# Patient Record
Sex: FEMALE | Race: OTHER | Employment: UNEMPLOYED | URBAN - METROPOLITAN AREA
[De-identification: names, ages, dates, MRNs, and addresses within clinical notes are randomized per-mention and may not be internally consistent; named-entity substitution may affect disease eponyms.]

---

## 2021-01-21 ENCOUNTER — OFFICE VISIT (OUTPATIENT)
Dept: FAMILY MEDICINE CLINIC | Age: 36
End: 2021-01-21

## 2021-01-21 ENCOUNTER — HOSPITAL ENCOUNTER (OUTPATIENT)
Dept: LAB | Age: 36
Discharge: HOME | End: 2021-01-21

## 2021-01-21 ENCOUNTER — HOSPITAL ENCOUNTER (OUTPATIENT)
Dept: GENERAL RADIOLOGY | Age: 36
Discharge: HOME | End: 2021-01-21

## 2021-01-21 VITALS
OXYGEN SATURATION: 99 % | BODY MASS INDEX: 28.47 KG/M2 | HEART RATE: 69 BPM | SYSTOLIC BLOOD PRESSURE: 116 MMHG | WEIGHT: 141.2 LBS | TEMPERATURE: 97.8 F | HEIGHT: 59 IN | DIASTOLIC BLOOD PRESSURE: 83 MMHG

## 2021-01-21 DIAGNOSIS — M54.50 CHRONIC BILATERAL LOW BACK PAIN WITHOUT SCIATICA: Primary | ICD-10-CM

## 2021-01-21 DIAGNOSIS — E83.51 HYPOCALCEMIA: ICD-10-CM

## 2021-01-21 DIAGNOSIS — Z23 ENCOUNTER FOR IMMUNIZATION: ICD-10-CM

## 2021-01-21 DIAGNOSIS — G44.219 EPISODIC TENSION-TYPE HEADACHE, NOT INTRACTABLE: ICD-10-CM

## 2021-01-21 DIAGNOSIS — Z13.9 ENCOUNTER FOR SCREENING: ICD-10-CM

## 2021-01-21 DIAGNOSIS — M54.50 CHRONIC BILATERAL LOW BACK PAIN WITHOUT SCIATICA: ICD-10-CM

## 2021-01-21 DIAGNOSIS — R42 DIZZINESS: ICD-10-CM

## 2021-01-21 DIAGNOSIS — E55.9 VITAMIN D DEFICIENCY: ICD-10-CM

## 2021-01-21 DIAGNOSIS — G89.29 CHRONIC BILATERAL LOW BACK PAIN WITHOUT SCIATICA: Primary | ICD-10-CM

## 2021-01-21 DIAGNOSIS — G89.29 CHRONIC BILATERAL LOW BACK PAIN WITHOUT SCIATICA: ICD-10-CM

## 2021-01-21 LAB
ALBUMIN SERPL-MCNC: 4.1 G/DL (ref 3.5–5)
ALBUMIN/GLOB SERPL: 1.2 {RATIO} (ref 1.1–2.2)
ALP SERPL-CCNC: 48 U/L (ref 45–117)
ALT SERPL-CCNC: 37 U/L (ref 12–78)
ANION GAP SERPL CALC-SCNC: 5 MMOL/L (ref 5–15)
AST SERPL-CCNC: 16 U/L (ref 15–37)
BILIRUB SERPL-MCNC: 0.2 MG/DL (ref 0.2–1)
BILIRUB UR QL STRIP: NEGATIVE
BUN SERPL-MCNC: 9 MG/DL (ref 6–20)
BUN/CREAT SERPL: 15 (ref 12–20)
CALCIUM SERPL-MCNC: 9.8 MG/DL (ref 8.5–10.1)
CHLORIDE SERPL-SCNC: 107 MMOL/L (ref 97–108)
CO2 SERPL-SCNC: 26 MMOL/L (ref 21–32)
CREAT SERPL-MCNC: 0.6 MG/DL (ref 0.55–1.02)
ERYTHROCYTE [DISTWIDTH] IN BLOOD BY AUTOMATED COUNT: 12.6 % (ref 11.5–14.5)
GLOBULIN SER CALC-MCNC: 3.3 G/DL (ref 2–4)
GLUCOSE SERPL-MCNC: 76 MG/DL (ref 65–100)
GLUCOSE UR-MCNC: NEGATIVE MG/DL
HCT VFR BLD AUTO: 40.6 % (ref 35–47)
HGB BLD-MCNC: 13.1 G/DL (ref 11.5–16)
KETONES P FAST UR STRIP-MCNC: NEGATIVE MG/DL
MCH RBC QN AUTO: 28.6 PG (ref 26–34)
MCHC RBC AUTO-ENTMCNC: 32.3 G/DL (ref 30–36.5)
MCV RBC AUTO: 88.6 FL (ref 80–99)
NRBC # BLD: 0 K/UL (ref 0–0.01)
NRBC BLD-RTO: 0 PER 100 WBC
PH UR STRIP: 6 [PH] (ref 4.6–8)
PLATELET # BLD AUTO: 326 K/UL (ref 150–400)
PMV BLD AUTO: 10.8 FL (ref 8.9–12.9)
POTASSIUM SERPL-SCNC: 4.6 MMOL/L (ref 3.5–5.1)
PROT SERPL-MCNC: 7.4 G/DL (ref 6.4–8.2)
PROT UR QL STRIP: NEGATIVE
RBC # BLD AUTO: 4.58 M/UL (ref 3.8–5.2)
SODIUM SERPL-SCNC: 138 MMOL/L (ref 136–145)
SP GR UR STRIP: 1.02 (ref 1–1.03)
UA UROBILINOGEN AMB POC: NORMAL (ref 0.2–1)
URINALYSIS CLARITY POC: CLEAR
URINALYSIS COLOR POC: YELLOW
URINE BLOOD POC: NEGATIVE
URINE LEUKOCYTES POC: NEGATIVE
URINE NITRITES POC: NEGATIVE
VIT B12 SERPL-MCNC: 712 PG/ML (ref 193–986)
WBC # BLD AUTO: 7.9 K/UL (ref 3.6–11)

## 2021-01-21 PROCEDURE — 82607 VITAMIN B-12: CPT

## 2021-01-21 PROCEDURE — 85027 COMPLETE CBC AUTOMATED: CPT

## 2021-01-21 PROCEDURE — 99214 OFFICE O/P EST MOD 30 MIN: CPT | Performed by: FAMILY MEDICINE

## 2021-01-21 PROCEDURE — 90471 IMMUNIZATION ADMIN: CPT | Performed by: FAMILY MEDICINE

## 2021-01-21 PROCEDURE — 81003 URINALYSIS AUTO W/O SCOPE: CPT | Performed by: FAMILY MEDICINE

## 2021-01-21 PROCEDURE — 90686 IIV4 VACC NO PRSV 0.5 ML IM: CPT | Performed by: FAMILY MEDICINE

## 2021-01-21 PROCEDURE — 82306 VITAMIN D 25 HYDROXY: CPT

## 2021-01-21 PROCEDURE — 72100 X-RAY EXAM L-S SPINE 2/3 VWS: CPT

## 2021-01-21 PROCEDURE — 80053 COMPREHEN METABOLIC PANEL: CPT

## 2021-01-21 NOTE — PROGRESS NOTES
Enriqueta Daily (: 1985) is a 30 y.o. female, established patient, here for evaluation of the following chief complaint(s):  LOW BACK PAIN (since 2017 when she had baby.), Dizziness (x headaches and head pain with inflammation x 1 year), and Immunization/Injection (FLU VACCINE)       ASSESSMENT/PLAN:  1. Chronic bilateral low back pain without sciatica  -     XR SPINE LUMB 2 OR 3 V; Future  Suggestive of sacroiliitis. Use topical Voltaren Gel. 2. Dizziness  Episodic with headaches. This is likely stress induced headaches. Will check labs due to chronicity. -     CBC W/O DIFF; Future  -     METABOLIC PANEL, COMPREHENSIVE; Future  -     VITAMIN B12; Future  3. Episodic tension-type headache, not intractable  As above  4. Encounter for screening  -     AMB POC URINALYSIS DIP STICK AUTO W/O MICRO  5. Hypocalcemia  On prior labs, will check Vitamin D level as deficiency could cause more pain. -     VITAMIN D, 25 HYDROXY; Future    Follow-up and Dispositions    · Return in about 4 weeks (around 2021) for F2F in 4-6 weeks for follow up. SUBJECTIVE/OBJECTIVE:  HPI  Back Pain: x 4 years, since her last pregnancy. Pain off/on on B lower back (SI joints) for years. Seems worse when she eats bread and rice. Over the past week the pain has been much worse. This week only has radiated at times into posterior Rt thigh. NO LE weakness or paresthesias. Has not tried any OTC medications. Dizziness: with headaches x 1 year. Gets Lt sided headaches, almost daily, x 1 year. Noted insomnia during this time. Started around the time that her children are not able to go to school due to [place]. Review of Systems  CONSTITUTIONAL:  Negative for chills, fever, weight change, malaise, night sweats. Positive for insomnia.   CARDIOVASCULAR:  Negative for palpitations, chest pain, syncope, dyspnea on exertion  NEUROLOGICAL:  Negative for vision changes, change in bowel or bladder function    Physical Exam  Blood pressure 116/83, pulse 69, temperature 97.8 °F (36.6 °C), temperature source Temporal, height 4' 11.06\" (1.5 m), weight 141 lb 3.2 oz (64 kg), last menstrual period 01/13/2021, SpO2 99 %. CONSTITUTIONAL:  Well developed. No apparent distress. PSYCHIATRIC: Oriented to time, place, person & situation. Appropriate mood and affect. NECK:  Normal inspection, normal palpation without any lymphadenopathy, masses, or thyromegaly  CARDIOVASCULAR:  Regular rate and rhythm. Normal S1, S2. No extra sounds. VASCULAR:  Normal Carotid pulses without bruits. MUSCULOSKELETAL: Spine appears normal with no curvature or deformity. Lumbar back has good flexion, extension, and rotation without pain. Pain reproduced over B SI joints. Results for orders placed or performed in visit on 01/21/21   AMB POC URINALYSIS DIP STICK AUTO W/O MICRO   Result Value Ref Range    Color (UA POC) Yellow     Clarity (UA POC) Clear     Glucose (UA POC) Negative Negative    Bilirubin (UA POC) Negative Negative    Ketones (UA POC) Negative Negative    Specific gravity (UA POC) 1.020 1.001 - 1.035    Blood (UA POC) Negative Negative    pH (UA POC) 6.0 4.6 - 8.0    Protein (UA POC) Negative Negative    Urobilinogen (UA POC) 0.2 mg/dL 0.2 - 1    Nitrites (UA POC) Negative Negative    Leukocyte esterase (UA POC) Negative Negative         An electronic signature was used to authenticate this note.   -- Umm Marmolejo MD

## 2021-01-21 NOTE — PROGRESS NOTES
Check-out Note: F2F in 4-6 weeks for follow up   Labs and xrays ordered   Patient instructions    AVS printed, given and reviewed. Walk in x-ray and care card process explained and FA application given. Patient aware that provider would like to follow up about today's visit in 4-6 weeks in person. Lab requisition form given and instructions on where to go to get labs drawn given. This has been fully explained to the patient, who indicates understanding and agrees with plan. No further questions at this time. BILL Berger RN administered flu vaccine. Please see scanned consent form for further details.

## 2021-01-21 NOTE — PROGRESS NOTES
Coordination of Care  1. Have you been to the ER, urgent care clinic since your last visit? Hospitalized since your last visit? No    2. Have you seen or consulted any other health care providers outside of the [address] [place] [address] since your last visit? Include any pap smears or colon screening. No    Does the patient need refills? NO    Learning Assessment Complete?  yes  Depression Screening complete in the past 12 months? yes     Results for orders placed or performed in visit on 01/21/21   AMB POC URINALYSIS DIP STICK AUTO W/O MICRO   Result Value Ref Range    Color (UA POC) Yellow     Clarity (UA POC) Clear     Glucose (UA POC) Negative Negative    Bilirubin (UA POC) Negative Negative    Ketones (UA POC) Negative Negative    Specific gravity (UA POC) 1.020 1.001 - 1.035    Blood (UA POC) Negative Negative    pH (UA POC) 6.0 4.6 - 8.0    Protein (UA POC) Negative Negative    Urobilinogen (UA POC) 0.2 mg/dL 0.2 - 1    Nitrites (UA POC) Negative Negative    Leukocyte esterase (UA POC) Negative Negative negative...

## 2021-01-21 NOTE — PROGRESS NOTES
Mild arthritis in the lower back which is likely causing some of her back pain. NO significant other findings.

## 2021-01-22 LAB — 25(OH)D3 SERPL-MCNC: 24.6 NG/ML (ref 30–100)

## 2021-01-22 RX ORDER — MELOXICAM 15 MG/1
15 TABLET ORAL
Qty: 30 TAB | Refills: 0 | Status: SHIPPED | OUTPATIENT
Start: 2021-01-22 | End: 2022-06-13

## 2021-01-22 NOTE — PROGRESS NOTES
Vitamin D is low and so start over the counter Vitamin D3 1,000 units once a day. Recheck Vitamin D in 6 months.   Other labs look good

## 2021-01-22 NOTE — PROGRESS NOTES
Tc to the pt with  Ramakrishna Peguero. The pt was given her spine xray result message from the provider. Mild arthritis which is likely causing some of her back pain. No significant finding. The pt asked the nurse if the Dr was going to give her any medications. The chart was reviewed no medications were ordered at this time. The pt would like to ask the Dr if he was going to order her something for the pain. The pt has mild back pain now but sometimes it is strong.  Routed the message to the Dr. Ileana Urbina RN

## 2021-01-26 NOTE — PROGRESS NOTES
Tc to the pt to let her know about the medication sent to her Pharmacy for her back pain. No answer. Called 2x left a message.  Bruno Zhong RN

## 2021-01-27 NOTE — PROGRESS NOTES
Tc to the pt Jose Blandno was . The pt was given her results and she was told the provider had sent in he ra new Rx for her back pain called Mobic, and he recommended she start taking an otc Vit D3 1000iu daily because her Vit D was low. The pt verbalized understanding and had no further questions.  Oscar Arango RN

## 2022-06-10 ENCOUNTER — HOSPITAL ENCOUNTER (EMERGENCY)
Age: 37
Discharge: HOME | End: 2022-06-10
Attending: STUDENT IN AN ORGANIZED HEALTH CARE EDUCATION/TRAINING PROGRAM

## 2022-06-10 ENCOUNTER — TELEPHONE (OUTPATIENT)
Dept: FAMILY MEDICINE CLINIC | Age: 37
End: 2022-06-10

## 2022-06-10 ENCOUNTER — APPOINTMENT (OUTPATIENT)
Dept: GENERAL RADIOLOGY | Age: 37
End: 2022-06-10
Attending: STUDENT IN AN ORGANIZED HEALTH CARE EDUCATION/TRAINING PROGRAM

## 2022-06-10 VITALS
OXYGEN SATURATION: 98 % | TEMPERATURE: 98 F | DIASTOLIC BLOOD PRESSURE: 66 MMHG | HEART RATE: 75 BPM | HEIGHT: 59 IN | RESPIRATION RATE: 17 BRPM | BODY MASS INDEX: 28.22 KG/M2 | SYSTOLIC BLOOD PRESSURE: 111 MMHG | WEIGHT: 140 LBS

## 2022-06-10 DIAGNOSIS — R07.9 CHEST PAIN, UNSPECIFIED TYPE: ICD-10-CM

## 2022-06-10 DIAGNOSIS — R11.0 NAUSEA WITHOUT VOMITING: ICD-10-CM

## 2022-06-10 DIAGNOSIS — R42 DIZZINESS: Primary | ICD-10-CM

## 2022-06-10 LAB
ALBUMIN SERPL-MCNC: 3.7 G/DL (ref 3.5–5)
ALBUMIN/GLOB SERPL: 1 {RATIO} (ref 1.1–2.2)
ALP SERPL-CCNC: 48 U/L (ref 45–117)
ALT SERPL-CCNC: 28 U/L (ref 12–78)
ANION GAP SERPL CALC-SCNC: 7 MMOL/L (ref 5–15)
APPEARANCE UR: CLEAR
AST SERPL-CCNC: 14 U/L (ref 15–37)
ATRIAL RATE: 74 BPM
BACTERIA URNS QL MICRO: NEGATIVE /HPF
BASOPHILS # BLD: 0 K/UL (ref 0–0.1)
BASOPHILS NFR BLD: 0 % (ref 0–1)
BILIRUB SERPL-MCNC: 0.2 MG/DL (ref 0.2–1)
BILIRUB UR QL: NEGATIVE
BUN SERPL-MCNC: 10 MG/DL (ref 6–20)
BUN/CREAT SERPL: 18 (ref 12–20)
CALCIUM SERPL-MCNC: 8.8 MG/DL (ref 8.5–10.1)
CALCULATED P AXIS, ECG09: 31 DEGREES
CALCULATED R AXIS, ECG10: 31 DEGREES
CALCULATED T AXIS, ECG11: 22 DEGREES
CHLORIDE SERPL-SCNC: 108 MMOL/L (ref 97–108)
CO2 SERPL-SCNC: 22 MMOL/L (ref 21–32)
COLOR UR: ABNORMAL
CREAT SERPL-MCNC: 0.57 MG/DL (ref 0.55–1.02)
D DIMER PPP FEU-MCNC: 0.19 MG/L FEU (ref 0–0.65)
DIAGNOSIS, 93000: NORMAL
DIFFERENTIAL METHOD BLD: NORMAL
EOSINOPHIL # BLD: 0 K/UL (ref 0–0.4)
EOSINOPHIL NFR BLD: 0 % (ref 0–7)
EPITH CASTS URNS QL MICRO: ABNORMAL /LPF
ERYTHROCYTE [DISTWIDTH] IN BLOOD BY AUTOMATED COUNT: 12.6 % (ref 11.5–14.5)
GLOBULIN SER CALC-MCNC: 3.7 G/DL (ref 2–4)
GLUCOSE SERPL-MCNC: 89 MG/DL (ref 65–100)
GLUCOSE UR STRIP.AUTO-MCNC: NEGATIVE MG/DL
HCG UR QL: NEGATIVE
HCT VFR BLD AUTO: 38.8 % (ref 35–47)
HGB BLD-MCNC: 12.8 G/DL (ref 11.5–16)
HGB UR QL STRIP: NEGATIVE
HYALINE CASTS URNS QL MICRO: ABNORMAL /LPF (ref 0–2)
IMM GRANULOCYTES # BLD AUTO: 0 K/UL (ref 0–0.04)
IMM GRANULOCYTES NFR BLD AUTO: 0 % (ref 0–0.5)
KETONES UR QL STRIP.AUTO: NEGATIVE MG/DL
LEUKOCYTE ESTERASE UR QL STRIP.AUTO: ABNORMAL
LYMPHOCYTES # BLD: 2 K/UL (ref 0.8–3.5)
LYMPHOCYTES NFR BLD: 20 % (ref 12–49)
MCH RBC QN AUTO: 27.6 PG (ref 26–34)
MCHC RBC AUTO-ENTMCNC: 33 G/DL (ref 30–36.5)
MCV RBC AUTO: 83.6 FL (ref 80–99)
MONOCYTES # BLD: 0.6 K/UL (ref 0–1)
MONOCYTES NFR BLD: 6 % (ref 5–13)
NEUTS SEG # BLD: 7.3 K/UL (ref 1.8–8)
NEUTS SEG NFR BLD: 74 % (ref 32–75)
NITRITE UR QL STRIP.AUTO: NEGATIVE
NRBC # BLD: 0 K/UL (ref 0–0.01)
NRBC BLD-RTO: 0 PER 100 WBC
P-R INTERVAL, ECG05: 144 MS
PH UR STRIP: 6.5 [PH] (ref 5–8)
PLATELET # BLD AUTO: 337 K/UL (ref 150–400)
PMV BLD AUTO: 10.3 FL (ref 8.9–12.9)
POTASSIUM SERPL-SCNC: 3.8 MMOL/L (ref 3.5–5.1)
PROT SERPL-MCNC: 7.4 G/DL (ref 6.4–8.2)
PROT UR STRIP-MCNC: NEGATIVE MG/DL
Q-T INTERVAL, ECG07: 402 MS
QRS DURATION, ECG06: 88 MS
QTC CALCULATION (BEZET), ECG08: 446 MS
RBC # BLD AUTO: 4.64 M/UL (ref 3.8–5.2)
RBC #/AREA URNS HPF: ABNORMAL /HPF (ref 0–5)
SODIUM SERPL-SCNC: 137 MMOL/L (ref 136–145)
SP GR UR REFRACTOMETRY: 1.01 (ref 1–1.03)
TROPONIN-HIGH SENSITIVITY: 4 NG/L (ref 0–51)
UROBILINOGEN UR QL STRIP.AUTO: 0.2 EU/DL (ref 0.2–1)
VENTRICULAR RATE, ECG03: 74 BPM
WBC # BLD AUTO: 10 K/UL (ref 3.6–11)
WBC URNS QL MICRO: ABNORMAL /HPF (ref 0–4)

## 2022-06-10 PROCEDURE — 80053 COMPREHEN METABOLIC PANEL: CPT

## 2022-06-10 PROCEDURE — 93005 ELECTROCARDIOGRAM TRACING: CPT

## 2022-06-10 PROCEDURE — 99285 EMERGENCY DEPT VISIT HI MDM: CPT

## 2022-06-10 PROCEDURE — 84484 ASSAY OF TROPONIN QUANT: CPT

## 2022-06-10 PROCEDURE — 85379 FIBRIN DEGRADATION QUANT: CPT

## 2022-06-10 PROCEDURE — 74011250636 HC RX REV CODE- 250/636: Performed by: STUDENT IN AN ORGANIZED HEALTH CARE EDUCATION/TRAINING PROGRAM

## 2022-06-10 PROCEDURE — 85025 COMPLETE CBC W/AUTO DIFF WBC: CPT

## 2022-06-10 PROCEDURE — 96360 HYDRATION IV INFUSION INIT: CPT

## 2022-06-10 PROCEDURE — 96361 HYDRATE IV INFUSION ADD-ON: CPT

## 2022-06-10 PROCEDURE — 81025 URINE PREGNANCY TEST: CPT

## 2022-06-10 PROCEDURE — 36415 COLL VENOUS BLD VENIPUNCTURE: CPT

## 2022-06-10 PROCEDURE — 81001 URINALYSIS AUTO W/SCOPE: CPT

## 2022-06-10 PROCEDURE — 74011250637 HC RX REV CODE- 250/637: Performed by: STUDENT IN AN ORGANIZED HEALTH CARE EDUCATION/TRAINING PROGRAM

## 2022-06-10 PROCEDURE — 71046 X-RAY EXAM CHEST 2 VIEWS: CPT

## 2022-06-10 RX ORDER — SODIUM CHLORIDE 9 MG/ML
1000 INJECTION, SOLUTION INTRAVENOUS ONCE
Status: COMPLETED | OUTPATIENT
Start: 2022-06-10 | End: 2022-06-10

## 2022-06-10 RX ORDER — MECLIZINE HYDROCHLORIDE 25 MG/1
50 TABLET ORAL
Status: COMPLETED | OUTPATIENT
Start: 2022-06-10 | End: 2022-06-10

## 2022-06-10 RX ORDER — ONDANSETRON 4 MG/1
4 TABLET, ORALLY DISINTEGRATING ORAL
Status: COMPLETED | OUTPATIENT
Start: 2022-06-10 | End: 2022-06-10

## 2022-06-10 RX ADMIN — MECLIZINE HYDROCHLORIDE 50 MG: 25 TABLET ORAL at 11:58

## 2022-06-10 RX ADMIN — ONDANSETRON 4 MG: 4 TABLET, ORALLY DISINTEGRATING ORAL at 15:19

## 2022-06-10 RX ADMIN — SODIUM CHLORIDE 1000 ML: 9 INJECTION, SOLUTION INTRAVENOUS at 11:59

## 2022-06-10 NOTE — TELEPHONE ENCOUNTER
RN received call from patient stating she wanted to see provider for current symptoms. RN triaged patient, patient reported feeling nauseas, dizzy, and chest pain 3/10. Patient stated she went to [facility] on 5/21/22 for the same symptoms and was informed it was a viral stomach infection. Patient stated she is still having the same symptoms but not as severe. RN informed patient to go to [facility] to evaluate chest pain, patient verbalized understanding. Appointment was made to evaluate other symptoms reported by patient for 5/13.   Shannan Cunningham

## 2022-06-10 NOTE — ED NOTES
Patient discharged per provider. Patient was in understanding of all instructions and to  her medication. Patient had no further questions.

## 2022-06-10 NOTE — TELEPHONE ENCOUNTER
I called patient back and spoke to her. I recommended for her to go to the nearest ED. She stated she went to [facility] ED on 5/21/22, and they did some testing. They told her it was not related to her heart, gave her some medication and send her home. She has been having dizzy spells, chest pains, nausea and vomiting, with no real improvement. I spoke with patient and advised that medications were sent to the pharmacy at patient's request. Also encouraged again to present to the ED. Offered patient an appt to come in to the office on Wednesday to have her wounds checked and see Dr. Akua Martinez. Patient declined, and asked for an appt after the first of the month due to transportation issues. I scheduled her for Friday, 7/2 at her request. She voiced understanding in waiting and also in not presenting to the ED but she really thinks the Linazolid is helping her wounds greatly.

## 2022-06-10 NOTE — TELEPHONE ENCOUNTER
Patient called this morning. She stated she is feeling very dizzy, and had chest pain two days ago. I'm calling a CVAN nurse, and documenting as well.

## 2022-06-10 NOTE — ED TRIAGE NOTES
Pt to ED for c/o dizziness, nausea, chest pain and head pain since 5/20/22 which has been intermittent since, states \"the room is spinning\". Chest pain described to be pressure like. Seen by [facility] ED and dc with no diagnosis per pt.  Awaiting appointment with [facility]/IMELDA     Denies PMH

## 2022-06-10 NOTE — ED PROVIDER NOTES
Patient is a 67-year-old female who is otherwise healthy presenting today with dizziness and chest pain. She reports that she had a similar episode back in May and was diagnosed with a viral stomach bug, discharged with Zofran which never really helped. She reports that 2 days ago she had a \"sharp\" pain in her left chest that lasted for 2 days but has since gone away and has left her with a pressure in the chest.  This morning around 3 AM she developed pressure in her head as well as a dizziness sensation with nausea but no vomiting or diarrhea. This is worse with head movement. She does feel like she is a little short of breath. She denies leg pain or leg swelling, no history of DVT or PE. No known cardiac disease. She denies focal weakness or numbness. No cough or fever. No cardiac or pulmonary history that she is aware of. Past Surgical History:   Procedure Laterality Date    HX  SECTION               Social History     Socioeconomic History    Marital status:      Spouse name: Not on file    Number of children: Not on file    Years of education: Not on file    Highest education level: Not on file   Occupational History    Not on file   Tobacco Use    Smoking status: Never Smoker    Smokeless tobacco: Never Used   Substance and Sexual Activity    Alcohol use: No     Alcohol/week: 0.0 standard drinks    Drug use: No    Sexual activity: Not on file   Other Topics Concern    Not on file   Social History Narrative    Not on file     Social Determinants of Health     Financial Resource Strain:     Difficulty of Paying Living Expenses: Not on file   Food Insecurity:     Worried About Running Out of Food in the Last Year: Not on file    Armaan of Food in the Last Year: Not on file   Transportation Needs:     Lack of Transportation (Medical): Not on file    Lack of Transportation (Non-Medical):  Not on file   Physical Activity:     Days of Exercise per Week: Not on file   [employer] of Exercise per Session: Not on file   Stress:     Feeling of Stress : Not on file   Social Connections:     Frequency of Communication with Friends and Family: Not on file    Frequency of Social Gatherings with Friends and Family: Not on file    Attends Protestant Services: Not on file    Active Member of Clubs or Organizations: Not on file    Attends Club or Organization Meetings: Not on file    Marital Status: Not on file   Intimate Partner Violence:     Fear of Current or Ex-Partner: Not on file    Emotionally Abused: Not on file    Physically Abused: Not on file    Sexually Abused: Not on file   Housing Stability:     Unable to Pay for Housing in the Last Year: Not on file    Number of [place] in the Last Year: Not on file    Unstable Housing in the Last Year: Not on file         ALLERGIES: Patient has no known allergies. Review of Systems   Constitutional: Negative for chills and fever. HENT: Negative for congestion and rhinorrhea. Eyes: Negative for redness and visual disturbance. Respiratory: Positive for shortness of breath. Negative for cough. Cardiovascular: Positive for chest pain. Negative for leg swelling. Gastrointestinal: Negative for abdominal pain, diarrhea, nausea and vomiting. Genitourinary: Negative for dysuria, flank pain, frequency, hematuria and urgency. Musculoskeletal: Negative for arthralgias, back pain, myalgias and neck pain. Skin: Negative for rash and wound. Allergic/Immunologic: Negative for immunocompromised state. Neurological: Positive for dizziness and headaches. Vitals:    06/10/22 1135 06/10/22 1506   BP: (!) 97/59 111/66   Pulse: 80 75   Resp: 16 17   Temp:  98 °F (36.7 °C)   SpO2: 98% 98%   Weight: 63.5 kg (140 lb)    Height: 4' 11\" (1.499 m)             Physical Exam  Vitals and nursing note reviewed. Constitutional:       General: She is not in acute distress. Appearance: She is well-developed.  She is not diaphoretic. HENT:      Head: Normocephalic. Mouth/Throat:      Pharynx: No oropharyngeal exudate. Eyes:      General:         Right eye: No discharge. Left eye: No discharge. Pupils: Pupils are equal, round, and reactive to light. Cardiovascular:      Rate and Rhythm: Normal rate and regular rhythm. Heart sounds: Normal heart sounds. No murmur heard. No friction rub. No gallop. Pulmonary:      Effort: Pulmonary effort is normal. No respiratory distress. Breath sounds: Normal breath sounds. No stridor. No wheezing or rales. Abdominal:      General: Bowel sounds are normal. There is no distension. Palpations: Abdomen is soft. Tenderness: There is no abdominal tenderness. There is no guarding or rebound. Musculoskeletal:         General: No deformity. Normal range of motion. Cervical back: Normal range of motion and neck supple. Skin:     General: Skin is warm and dry. Capillary Refill: Capillary refill takes less than 2 seconds. Findings: No rash. Neurological:      General: No focal deficit present. Mental Status: She is alert and oriented to person, place, and time. Cranial Nerves: No cranial nerve deficit. Sensory: No sensory deficit. Gait: Gait normal.   Psychiatric:         Behavior: Behavior normal.          MDM       Procedures      ED EKG interpretation  NSR rate 74, normal axis and intervals, non specific STT wave changes. The patient is a 44-year-old female presenting today with recurrence of similar symptoms that she had back in May consisting of chest pain, shortness of breath, nausea and dizziness. Here she is in no acute distress and has a nonfocal neurologic exam.  Her vital signs are stable. She describes her dizziness as a spinning sensation worse with head movement, likely peripheral source rather than central given age and lack of risk factors.   She had a work-up for the same thing at [facility] already last month. Here her work-up including troponin, EKG, D-dimer, metabolic panel and CBC all reassuring. I doubt ACS or arrhythmia. I do not suspect PE given normal D-dimer. Meclizine given for dizziness. Zofran given for nausea. IV fluids given and repeat vital signs remained stable. Patient okay for discharge home with prescription for meclizine and PCP follow-up. ICD-10-CM ICD-9-CM    1. Dizziness  R42 780.4    2. Nausea without vomiting  R11.0 787.02    3.  Chest pain, unspecified type  R07.9 786.50      DC  Christopher Gilmore, DO

## 2022-06-13 ENCOUNTER — OFFICE VISIT (OUTPATIENT)
Dept: FAMILY MEDICINE CLINIC | Age: 37
End: 2022-06-13

## 2022-06-13 ENCOUNTER — HOSPITAL ENCOUNTER (OUTPATIENT)
Dept: LAB | Age: 37
Discharge: HOME | End: 2022-06-13

## 2022-06-13 VITALS
HEART RATE: 62 BPM | WEIGHT: 144 LBS | OXYGEN SATURATION: 96 % | TEMPERATURE: 98 F | DIASTOLIC BLOOD PRESSURE: 60 MMHG | BODY MASS INDEX: 29.08 KG/M2 | SYSTOLIC BLOOD PRESSURE: 96 MMHG

## 2022-06-13 DIAGNOSIS — Z13.9 ENCOUNTER FOR SCREENING: Primary | ICD-10-CM

## 2022-06-13 DIAGNOSIS — R42 DIZZINESS: ICD-10-CM

## 2022-06-13 LAB — HGB BLD-MCNC: 11.8 G/DL

## 2022-06-13 PROCEDURE — 99214 OFFICE O/P EST MOD 30 MIN: CPT | Performed by: FAMILY MEDICINE

## 2022-06-13 PROCEDURE — 84443 ASSAY THYROID STIM HORMONE: CPT

## 2022-06-13 PROCEDURE — 85018 HEMOGLOBIN: CPT | Performed by: FAMILY MEDICINE

## 2022-06-13 PROCEDURE — 82306 VITAMIN D 25 HYDROXY: CPT

## 2022-06-13 RX ORDER — MECLIZINE HYDROCHLORIDE 25 MG/1
TABLET ORAL
COMMUNITY
Start: 2022-06-10 | End: 2022-06-13

## 2022-06-13 NOTE — PROGRESS NOTES
Coordination of Care  1. Have you been to the ER, urgent care clinic since your last visit? Hospitalized since your last visit? 5/21/22 [facility]    2. Have you seen or consulted any other health care providers outside of the [address] [place] [address] since your last visit? Include any pap smears or colon screening. No    Does the patient need refills? N/A    Learning Assessment Complete?  yes

## 2022-06-13 NOTE — PROGRESS NOTES
An After Visit Summary was printed and given to the patient. RN provided lab requisition and explained directions to get to lab draw site. Time for questions and answers provided, patient verbalized understanding. Patient discharged from clinic in stable condition. DILCIA  assisted with this d/c.

## 2022-06-13 NOTE — PROGRESS NOTES
Gigi Daniels is a 29 y.o. female   No chief complaint on file. ASSESSMENT AND PLAN:    1. Encounter for screening  Normal POC Hgb today. - AMB POC HEMOGLOBIN (HGB)    2. Dizziness  Resolved. Labs per patient request.  Follow up pending results. - VITAMIN D, 25 HYDROXY; Future  - TSH 3RD GENERATION; Future      SUBJECTIVE:    HPI:  Nils Bzaan is a 29 y.o. female who presents for ER follow-up. She was first seen 5/21/22 at [place] [address].  She had dizziness, nausea and vomiting, chest pressure, and lightheadedness. She was told she had gastroenteritis and was given Zofran. After about 2 weeks the symptoms finally resolved. 6/10/22 she want to [facility] because she developed the same symptoms. She also felt very weak and had cold hands and feet. She had a normal EKG, CXR and labwork. She was told she had vertigo. She was given meclizine and zofran. These symptoms have resolved. She does not need the meclizine, but it helped at the moment. She is requesting repeat labwork - vitamin D in particular because of a history of deficiency. Her last pap was 5 years ago and she would like an appointment for one. PMH: none  PSH: appendectomy, C/S  Meds; None  All: NKDA  SH: Denies  FH: M- DM2, F - HLD. Review of Systems   Constitutional: Negative for fever and malaise/fatigue. Eyes: Negative for blurred vision. Respiratory: Negative for cough and shortness of breath. Cardiovascular: Negative for chest pain, palpitations and leg swelling. Gastrointestinal: Negative for abdominal pain, constipation, diarrhea, nausea and vomiting. Neurological: Negative for dizziness and headaches. BP 96/60 (BP 1 Location: Left upper arm, BP Patient Position: Sitting)   Pulse 62   Temp 98 °F (36.7 °C) (Temporal)   Wt 144 lb (65.3 kg)   LMP 05/22/2022   SpO2 96%   BMI 29.08 kg/m²     Physical Exam  Constitutional:       General: She is not in acute distress. Appearance: Normal appearance. Eyes:      Extraocular Movements: Extraocular movements intact. Conjunctiva/sclera: Conjunctivae normal.      Pupils: Pupils are equal, round, and reactive to light. Cardiovascular:      Rate and Rhythm: Normal rate and regular rhythm. Heart sounds: Normal heart sounds. Pulmonary:      Effort: Pulmonary effort is normal.      Breath sounds: Normal breath sounds. Neurological:      General: No focal deficit present. Mental Status: She is alert and oriented to person, place, and time.

## 2022-06-14 LAB
25(OH)D3 SERPL-MCNC: 16.4 NG/ML (ref 30–100)
TSH SERPL DL<=0.05 MIU/L-ACNC: 3.71 UIU/ML (ref 0.36–3.74)

## 2022-06-15 ENCOUNTER — TELEPHONE (OUTPATIENT)
Dept: FAMILY MEDICINE CLINIC | Age: 37
End: 2022-06-15

## 2022-06-15 DIAGNOSIS — E55.9 VITAMIN D DEFICIENCY: Primary | ICD-10-CM

## 2022-06-27 ENCOUNTER — TELEPHONE (OUTPATIENT)
Dept: FAMILY MEDICINE CLINIC | Age: 37
End: 2022-06-27

## 2022-07-05 ENCOUNTER — OFFICE VISIT (OUTPATIENT)
Dept: FAMILY MEDICINE CLINIC | Age: 37
End: 2022-07-05

## 2022-07-05 ENCOUNTER — HOSPITAL ENCOUNTER (OUTPATIENT)
Dept: LAB | Age: 37
Discharge: HOME | End: 2022-07-05

## 2022-07-05 VITALS
OXYGEN SATURATION: 100 % | SYSTOLIC BLOOD PRESSURE: 98 MMHG | HEART RATE: 70 BPM | TEMPERATURE: 98.4 F | WEIGHT: 143 LBS | BODY MASS INDEX: 28.88 KG/M2 | DIASTOLIC BLOOD PRESSURE: 60 MMHG

## 2022-07-05 DIAGNOSIS — Z01.419 ENCOUNTER FOR WELL WOMAN EXAM: Primary | ICD-10-CM

## 2022-07-05 PROCEDURE — 99395 PREV VISIT EST AGE 18-39: CPT | Performed by: PHYSICIAN ASSISTANT

## 2022-07-05 PROCEDURE — 88175 CYTOPATH C/V AUTO FLUID REDO: CPT

## 2022-07-05 PROCEDURE — 87624 HPV HI-RISK TYP POOLED RSLT: CPT

## 2022-07-05 NOTE — PROGRESS NOTES
Assessment/Plan:    Diagnoses and all orders for this visit:    1. Encounter for well woman exam  -     PAP IG, APTIMA HPV AND RFX 16/18,45 ([id number]); Future        Follow-up and Dispositions    · Return in about 1 year (around 2023). WADE Simshemal Bentonto Isis lisa expressed understanding of this plan. An AVS was printed and given to the patient.      ----------------------------------------------------------------------    Chief Complaint   Patient presents with    Well Woman       History of Present Illness:  Pt presents for well woman exam  She is  , oldest 15, youngest is 3. All 3   She is not on any form of contraception, her  uses coitus interruptus. No risk of DV in her relationship  Last pap about 5 years ago, no hx of abnl pap. We don't have record of previous pap  She has fairly normal monthly periods- a year or so ago she missed a month then had very heavy bleeding with clots. She took home pregnancy tests that were neg. Since then, she has worked hard on eating a healthy diet and getting more exercise and her periods have been normal      No past medical history on file. No Known Allergies    Social History     Tobacco Use    Smoking status: Never Smoker    Smokeless tobacco: Never Used   Substance Use Topics    Alcohol use: No     Alcohol/week: 0.0 standard drinks    Drug use: No       No family history on file. Physical Exam:     Visit Vitals  BP 98/60 (BP 1 Location: Left upper arm, BP Patient Position: Sitting)   Pulse 70   Temp 98.4 °F (36.9 °C) (Temporal)   Wt 143 lb (64.9 kg)   LMP 2022   SpO2 100%   BMI 28.88 kg/m²       A&Ox3  WDWN NAD  Respirations normal and non labored  Pelvic exam- ext neg for lesion or discharge. Cervix with thick physiologic discharge present, otherwise no lesions or abnormal findings.  Uterus and adnexal exam neg for mass or tenderness

## 2022-07-05 NOTE — PATIENT INSTRUCTIONS
Pap Test: [facility]  Pap Test: Care Instructions  Mga Tagubilin sa [facility]  Ang Pap test (tinatawag ding Pap smear) ay isang screening test para sa cancer sa cervix, na nasa ibabang bahagi ng matris na karugtong ng puwerta. Makakatulong ang pagsusuri na malaman ng iyong doktor ang mga maagang pagbabago sa mga selyula na maaaring [facility] sa cancer. Ipapadala sa laboratoryo ang mga sample ng selyula na kinukuha sa pagsusuri upang masuri ng isang eksperto ang mga selyula. Kadalascindi ay tumatagal kimberly [place] o dalawang linggo bago lumabas ang mga resulta. Ang follow-up na [facility] ay isang [facility] bahagi ng iyong paggamot at [facility]. Gene souza at [address] [facility] ang naveen ng appointment, at [address]  ang iyong doktor kym michellekakaproblema ka. Mainam ding alamin ang mga resulta ng iyong mga pagsusuri at [facility] ng mga iniinom yoli gamot. Ano ang ibig sabihin ng mga resulta? · Ang normal na resulta ay nangangahulugang walang mga nakitang teo normal na selyula sa sample. · Maaaring maraming ibig sabihin ang teo normal na resulta. Karamihan ng mga frankie ay teo cancer. Maaaring teo normal ang mga resulta ng pagsusuri dahil:  ? Mayroon barrera impeksyon sa puwerta o cervix, gaya ng yeast infection. ? Mayroon barrera IUD (intrauterine device na pampigil sa pagbubuntis). ? Mababa ang iyong antas ng estrogen pagkatapos ng menopause na nagiging dahilan ng pagbabago sa mga selyula. ? Mayroon barrera mga pagbabago sa selyula na maaaring senyales ng pagsisimula ng cancer o cancer mismo. Ang mga resulta ay pinagsusunud-sunod batay sa kym gaano kalala maaaring maging ang mga pagbabago. Marami jayden ibang dahilan kym bakit teo normal ang nakuha yoli resulta. Kym teo normal ang mga resulta, maaaring kailanganin yoli sumailalim ulit sa isang pagsusuri sa loob ng ilang linggo o buwan.  Kym ang mga resulta ay magpapakita ng mga pagbabago na maaaring senyales ng cancer, maaaring kailanganin mo ng pagsusuring tinatawag na colposcopy, na nagbibigay ng mas kumpletong pagtingin sa cervix. Kym alfonso, teo magamit ng laboratoryo ang sample dahil teo frankie naglalaman ng mga sapat na selyula o teo frankie naitabi kimberly maayos. Kym gayon, maaaring kailanganin yoli sumailalim ulit sa pagsusuri. Teo frankie karaniwan, ngunit nangyayari frankie paminsan-minsan. Kailan ka dapat humingi ng tulong? Bantayan kimberly mabuti kym magkakaroon ng mga pagbabago sa [employer], at siguraduhing tatawag ka sa iyong doktor kym:  · Kym nagdurugo ang iyong puwerta o nakakaranas ka ng pananakit sa loob ng mahigit 2 araw pagkatapos ng pagsusuri. Normal na duguin ka kimberly kaunti sa loob ng [place] o dalawang araw pagkatapos ng pagsusuri. Saan ka ivan carringtono pa? Pumunta sa   [email or web address]  Magleticia Q635 sa box para sa paghahanap upang matuto pa [employer] \"Pap Test: Mga Tagubilin sa [facility]. \"  Pushpa Daria noong: Setyembre 8, 2021               Bersyon ng Agaalaman: 13.2  © 9804-8496 [employer]. Ang mga tagubilin sa pangangalaga ay iniangkop sa ilalim ng lisensya ng iyong propesyonal ng [facility]. Kym barrera mga tanong tungkol sa isang medikal na kundisyon o sa tagubiling frankie, palaging magtanong sa iyong propesyonal ng pangangalagang pangkalusugan. Itinatanggi tammy [employer] ang anumang warranty o sagutin para sa iyong paggamit ng impormasyong frankie.

## 2022-07-05 NOTE — PROGRESS NOTES
An After Visit Summary was printed and given to the patient. Information about EWL for the future provided. Time for questions and answers provided, patient verbalized understanding. Patient discharged from clinic in stable condition. CVAN  Ai Fleming assisted with this d/c.

## 2022-07-05 NOTE — PROGRESS NOTES
[address] [facility]    When was your last pap smear and where? 2017    Any abnormal results from previous pap smears? no    Any problems with your breasts? no    Any family history of breast/ovarian cancer? no    Do you have any kids? yes    Oldest 14,9  youngest 3    Are you sexually active? yes    Are you using any type of birth control? If yes where do you go for this? no    Abuse screening completed this visit?  yes

## 2022-07-08 NOTE — PROGRESS NOTES
Per providers following notes: \"Please send note: your pap is negative/ normal. Your next pap should be in 5 years. Thank you . \" CMA printed and mailed the PAP smear letter to the pt's address on file

## 2023-06-02 NOTE — TELEPHONE ENCOUNTER
1. Vitamin D deficiency  Recommend 4000U daily for 3 months. Patient will purchase OTC. Normal TSH and labs from ED reviewed. Follow up PRN. Yes